# Patient Record
Sex: FEMALE | URBAN - METROPOLITAN AREA
[De-identification: names, ages, dates, MRNs, and addresses within clinical notes are randomized per-mention and may not be internally consistent; named-entity substitution may affect disease eponyms.]

---

## 2022-07-29 ENCOUNTER — APPOINTMENT (RX ONLY)
Dept: URBAN - METROPOLITAN AREA CLINIC 154 | Facility: CLINIC | Age: 27
Setting detail: DERMATOLOGY
End: 2022-07-29

## 2022-08-18 ENCOUNTER — APPOINTMENT (RX ONLY)
Dept: URBAN - METROPOLITAN AREA CLINIC 154 | Facility: CLINIC | Age: 27
Setting detail: DERMATOLOGY
End: 2022-08-18

## 2022-08-18 DIAGNOSIS — Z41.9 ENCOUNTER FOR PROCEDURE FOR PURPOSES OTHER THAN REMEDYING HEALTH STATE, UNSPECIFIED: ICD-10-CM

## 2022-08-18 PROCEDURE — ? LASER TATTOO REMOVAL

## 2022-08-18 ASSESSMENT — LOCATION ZONE DERM
LOCATION ZONE: FEET
LOCATION ZONE: LEG

## 2022-08-18 ASSESSMENT — LOCATION DETAILED DESCRIPTION DERM
LOCATION DETAILED: RIGHT LATERAL MALLEOLUS
LOCATION DETAILED: RIGHT LATERAL ANKLE
LOCATION DETAILED: RIGHT LATERAL ACHILLES SKIN

## 2022-08-18 ASSESSMENT — LOCATION SIMPLE DESCRIPTION DERM
LOCATION SIMPLE: RIGHT FOOT
LOCATION SIMPLE: RIGHT LOWER LEG

## 2022-08-18 NOTE — PROCEDURE: LASER TATTOO REMOVAL
Treatment Number: 1
Laser Type: Q-switched Alexandrite 755nm
Detail Level: Detailed
Fluence: 6
Spot Size In Mm: 2
Fluence: 5
Post-Care Instructions: I reviewed with the patient in detail post-care instructions. Patient should apply vaseline twice daily to tattoo and keep it covered with a non-stick adhesive dressing.
Pre-Procedure Text: The treatment areas were cleaned and treated with the laser parameters noted above.
Tattoo Color Treated: Black
Spot Size In Mm: 3
Anesthesia Type: 1% lidocaine with epinephrine
Wavelength Used (Optional - Include Units): 1064nm
Post-Procedure Text: Vaseline and ice applied. Post care reviewed with patient.
External Cooling Fan Speed: 0
Endpoint Text: Immediate endpoint: skin whitening and pinpoint bleeding.
Fluence: 5.5
Laser Type: Q-switched Nd:Yag 1064nm
Consent: Written consent obtained, risks reviewed including but not limited to crusting, scabbing, blistering, scarring, darker or lighter pigmentary change, systemic reactions, ulceration, incidental hair removal, bruising, and/or incomplete removal.

## 2022-09-09 ENCOUNTER — APPOINTMENT (RX ONLY)
Dept: URBAN - METROPOLITAN AREA CLINIC 154 | Facility: CLINIC | Age: 27
Setting detail: DERMATOLOGY
End: 2022-09-09

## 2022-11-30 ENCOUNTER — RX ONLY (OUTPATIENT)
Age: 27
Setting detail: RX ONLY
End: 2022-11-30

## 2022-11-30 RX ORDER — ONDANSETRON 8 MG/1
TABLET, ORALLY DISINTEGRATING ORAL
Qty: 15 | Refills: 1 | Status: ERX | COMMUNITY
Start: 2022-11-30

## 2023-05-13 ENCOUNTER — APPOINTMENT (RX ONLY)
Dept: URBAN - METROPOLITAN AREA CLINIC 154 | Facility: CLINIC | Age: 28
Setting detail: DERMATOLOGY
End: 2023-05-13

## 2023-05-13 DIAGNOSIS — Z41.9 ENCOUNTER FOR PROCEDURE FOR PURPOSES OTHER THAN REMEDYING HEALTH STATE, UNSPECIFIED: ICD-10-CM

## 2023-05-13 PROCEDURE — ? LASER HAIR REMOVAL

## 2023-05-13 PROCEDURE — ? HYDRAFACIAL

## 2023-05-13 ASSESSMENT — LOCATION SIMPLE DESCRIPTION DERM: LOCATION SIMPLE: LEFT FOREHEAD

## 2023-05-13 ASSESSMENT — LOCATION ZONE DERM: LOCATION ZONE: FACE

## 2023-05-13 ASSESSMENT — LOCATION DETAILED DESCRIPTION DERM: LOCATION DETAILED: LEFT MEDIAL FOREHEAD

## 2023-05-13 NOTE — PROCEDURE: HYDRAFACIAL
Vacuum Pressure High Setting (Will Not Render If Set To 0): 0
Solution: Beta-HD
Consent: Written consent obtained, risks reviewed including but not limited to crusting, scabbing, blistering, scarring, darker or lighter pigmentary change, bruising, and/or incomplete response.
Solution Override
Tip Override
Solution: Antiox-6
Tip: Hydropeel Tip, Clear
Post-Care Instructions: I reviewed with the patient in detail post-care instructions. Patient should stay away from the sun and wear sun protection until treated areas are fully healed.
Treatment Number: 1
Tip: Hydropeel Tip, Blue
Indication: skin texture
Tip: Hydropeel Tip, Teal
Location: face
Procedure: Peel
Procedure: Exfoliation
Procedure: Extraction
Solution: Activ-4
Solution: GlySal 15%
Procedure: Boost

## 2023-05-13 NOTE — PROCEDURE: LASER HAIR REMOVAL
Laser Type: Desire Yag 1060nm
Render Post-Care In The Note: No
Were Eye Shields Employed?: Yes
Number Of Prepaid Treatments (Will Not Render If 0): 0
Post-Procedure Care: Immediate endpoint: perifollicular erythema and edema. Vaseline and ice applied. Post care reviewed with patient.
Total Pulses: 124
Spot Size: 18 mm
Tolerated Procedure (Optional): 9 out of 10
Fluence (Will Not Render If 0): 20
Fluence (Will Not Render If 0): 20
Detail Level: Detailed
Fluence (Will Not Render If 0): 14
Shaving (Optional): The patient shaved at home
Eye Shield Text: Given the treatment area eye shields were inserted prior to treatment.
Consent: Written consent obtained, risks reviewed including but not limited to crusting, scabbing, blistering, scarring, darker or lighter pigmentary change, paradoxical hair regrowth, incomplete removal of hair and infection.
Pre-Procedure: Prior to proceeding the treatment areas were cleaned and all present put on their eye protection.
Fluence (Will Not Render If 0): 10
Post-Care Instructions: I reviewed with the patient in detail post-care instructions. Patient should avoid sun for a minimum of 4 weeks before and after treatment.
Total Pulses: 63

## 2023-06-17 ENCOUNTER — APPOINTMENT (RX ONLY)
Dept: URBAN - METROPOLITAN AREA CLINIC 154 | Facility: CLINIC | Age: 28
Setting detail: DERMATOLOGY
End: 2023-06-17

## 2023-06-17 DIAGNOSIS — Z41.9 ENCOUNTER FOR PROCEDURE FOR PURPOSES OTHER THAN REMEDYING HEALTH STATE, UNSPECIFIED: ICD-10-CM

## 2023-06-17 PROCEDURE — ? LASER HAIR REMOVAL

## 2023-06-17 NOTE — PROCEDURE: LASER HAIR REMOVAL
Post-Procedure Care: Immediate endpoint: perifollicular erythema and edema. Vaseline and ice applied. Post care reviewed with patient.
Number Of Prepaid Treatments (Will Not Render If 0): 0
Spot Size: 18 mm
Shaving (Optional): The patient shaved at home
Render Post-Care In The Note: No
Total Pulses: 124
Eye Shield Text: Given the treatment area eye shields were inserted prior to treatment.
Fluence (Will Not Render If 0): 20
Fluence (Will Not Render If 0): 18
Fluence (Will Not Render If 0): 20
Laser Type: Desire Yag 1060nm
Fluence (Will Not Render If 0): 10
Tolerated Procedure (Optional): 9 out of 10
Consent: Written consent obtained, risks reviewed including but not limited to crusting, scabbing, blistering, scarring, darker or lighter pigmentary change, paradoxical hair regrowth, incomplete removal of hair and infection.
Were Eye Shields Employed?: Yes
Pre-Procedure: Prior to proceeding the treatment areas were cleaned and all present put on their eye protection.
Detail Level: Detailed
Total Pulses: 32
Post-Care Instructions: I reviewed with the patient in detail post-care instructions. Patient should avoid sun for a minimum of 4 weeks before and after treatment.

## 2023-07-28 ENCOUNTER — APPOINTMENT (RX ONLY)
Dept: URBAN - METROPOLITAN AREA CLINIC 154 | Facility: CLINIC | Age: 28
Setting detail: DERMATOLOGY
End: 2023-07-28

## 2023-07-28 DIAGNOSIS — Z41.9 ENCOUNTER FOR PROCEDURE FOR PURPOSES OTHER THAN REMEDYING HEALTH STATE, UNSPECIFIED: ICD-10-CM

## 2023-07-28 PROCEDURE — ? LASER HAIR REMOVAL

## 2023-07-28 PROCEDURE — ? HYDRAFACIAL

## 2023-07-28 ASSESSMENT — LOCATION ZONE DERM: LOCATION ZONE: FACE

## 2023-07-28 ASSESSMENT — LOCATION SIMPLE DESCRIPTION DERM: LOCATION SIMPLE: LEFT FOREHEAD

## 2023-07-28 ASSESSMENT — LOCATION DETAILED DESCRIPTION DERM: LOCATION DETAILED: LEFT INFERIOR MEDIAL FOREHEAD

## 2023-07-28 NOTE — PROCEDURE: HYDRAFACIAL
Tip: Hydropeel Tip, Teal
Solution: Antiox-6
Tip Override
Vacuum Pressure High Setting (Will Not Render If Set To 0): 0
Location: face
Procedure: Peel
Solution: Activ-4
Solution Override
Tip: Hydropeel Tip, Clear
Procedure: Extraction
Solution: GlySal 15%
Consent: Written consent obtained, risks reviewed including but not limited to crusting, scabbing, blistering, scarring, darker or lighter pigmentary change, bruising, and/or incomplete response.
Treatment Number: 1
Procedure: Boost
Solution: Beta-HD
Tip: Hydropeel Tip, Blue
Indication: skin texture
Post-Care Instructions: I reviewed with the patient in detail post-care instructions. Patient should stay away from the sun and wear sun protection until treated areas are fully healed.
Procedure: Exfoliation

## 2023-07-28 NOTE — PROCEDURE: LASER HAIR REMOVAL
Pre-Procedure: Prior to proceeding the treatment areas were cleaned and all present put on their eye protection.
Treatment Number: 0
Total Pulses: 22
Tolerated Procedure (Optional): 9 out of 10
Consent: Written consent obtained, risks reviewed including but not limited to crusting, scabbing, blistering, scarring, darker or lighter pigmentary change, paradoxical hair regrowth, incomplete removal of hair and infection.
Post-Procedure Care: Immediate endpoint: perifollicular erythema and edema. Vaseline and ice applied. Post care reviewed with patient.
Spot Size: Large Sapphire Optics
Spot Size: 18 mm
Detail Level: Detailed
Post-Care Instructions: I reviewed with the patient in detail post-care instructions. Patient should avoid sun for a minimum of 4 weeks before and after treatment.
Were Eye Shields Employed?: Yes
Total Pulses: 124
Laser Type: 650nm
Fluence (Will Not Render If 0): 20
Fluence (Will Not Render If 0): 18
Shaving (Optional): The patient shaved at home
Fluence (Will Not Render If 0): 20
Render Post-Care In The Note: No
Eye Shield Text: Given the treatment area eye shields were inserted prior to treatment.
Fluence (Will Not Render If 0): 10

## 2023-09-09 ENCOUNTER — APPOINTMENT (RX ONLY)
Dept: URBAN - METROPOLITAN AREA CLINIC 154 | Facility: CLINIC | Age: 28
Setting detail: DERMATOLOGY
End: 2023-09-09

## 2023-09-09 DIAGNOSIS — Z41.9 ENCOUNTER FOR PROCEDURE FOR PURPOSES OTHER THAN REMEDYING HEALTH STATE, UNSPECIFIED: ICD-10-CM

## 2023-09-09 PROCEDURE — ? LASER HAIR REMOVAL

## 2023-09-09 NOTE — PROCEDURE: LASER HAIR REMOVAL
Render Post-Care In The Note: No
Number Of Prepaid Treatments (Will Not Render If 0): 0
Fluence (Will Not Render If 0): 19
Pre-Procedure: Prior to proceeding the treatment areas were cleaned and all present put on their eye protection.
Tolerated Procedure (Optional): 9 out of 10
Post-Procedure Care: Immediate endpoint: perifollicular erythema and edema. Vaseline and ice applied. Post care reviewed with patient.
Fluence (Will Not Render If 0): 20
Were Eye Shields Employed?: Yes
Fluence (Will Not Render If 0): 20
Detail Level: Detailed
Shaving (Optional): The patient shaved at home
Total Pulses: 269
Spot Size: Large Sapphire Optics
Consent: Written consent obtained, risks reviewed including but not limited to crusting, scabbing, blistering, scarring, darker or lighter pigmentary change, paradoxical hair regrowth, incomplete removal of hair and infection.
Eye Shield Text: Given the treatment area eye shields were inserted prior to treatment.
Spot Size: Large Sapphire Optics
Laser Type: Desire Yag 1060nm
Post-Care Instructions: I reviewed with the patient in detail post-care instructions. Patient should avoid sun for a minimum of 4 weeks before and after treatment.
Total Pulses: 18

## 2023-10-25 ENCOUNTER — APPOINTMENT (RX ONLY)
Dept: URBAN - METROPOLITAN AREA CLINIC 154 | Facility: CLINIC | Age: 28
Setting detail: DERMATOLOGY
End: 2023-10-25

## 2023-10-25 DIAGNOSIS — Z41.9 ENCOUNTER FOR PROCEDURE FOR PURPOSES OTHER THAN REMEDYING HEALTH STATE, UNSPECIFIED: ICD-10-CM

## 2023-10-25 PROCEDURE — ? HYDRAFACIAL

## 2023-10-25 PROCEDURE — ? LASER HAIR REMOVAL

## 2023-10-25 NOTE — PROCEDURE: HYDRAFACIAL
Treatment Number: 1
Vacuum Pressure Low Setting (Will Not Render If Set To 0): 0
Indication: skin texture
Consent: Written consent obtained, risks reviewed including but not limited to crusting, scabbing, blistering, scarring, darker or lighter pigmentary change, bruising, and/or incomplete response.
Tip: Hydropeel Tip, Blue
Solution: Beta-HD
Tip Override
Tip: Hydropeel Tip, Teal
Procedure: Exfoliation
Post-Care Instructions: I reviewed with the patient in detail post-care instructions. Patient should stay away from the sun and wear sun protection until treated areas are fully healed.
Solution Override
Tip: Hydropeel Tip, Clear
Location: face
Solution: Activ-4
Procedure: Peel
Procedure: Boost
Procedure: Extraction
Solution: Antiox-6
Solution: GlySal 7.5%

## 2023-10-25 NOTE — PROCEDURE: LASER HAIR REMOVAL
Spot Size: Large Sapphire Optics
Consent: Written consent obtained, risks reviewed including but not limited to crusting, scabbing, blistering, scarring, darker or lighter pigmentary change, paradoxical hair regrowth, incomplete removal of hair and infection.
Fluence (Will Not Render If 0): 18
Treatment Number: 0
Pre-Procedure: Prior to proceeding the treatment areas were cleaned and all present put on their eye protection.
Detail Level: Detailed
Post-Procedure Care: Immediate endpoint: perifollicular erythema and edema. Vaseline and ice applied. Post care reviewed with patient.
Total Pulses: 34
Tolerated Procedure (Optional): 9 out of 10
Were Eye Shields Employed?: Yes
Total Pulses: 18
Spot Size: Large Sapphire Optics
Shaving (Optional): The patient shaved at home
Total Pulses: 41
Post-Care Instructions: I reviewed with the patient in detail post-care instructions. Patient should avoid sun for a minimum of 4 weeks before and after treatment.
Laser Type: Desire Yag 1060nm
Fluence (Will Not Render If 0): 20
Eye Shield Text: Given the treatment area eye shields were inserted prior to treatment.
Fluence (Will Not Render If 0): 20
Render Post-Care In The Note: No

## 2023-12-16 ENCOUNTER — APPOINTMENT (RX ONLY)
Dept: URBAN - METROPOLITAN AREA CLINIC 154 | Facility: CLINIC | Age: 28
Setting detail: DERMATOLOGY
End: 2023-12-16

## 2023-12-16 DIAGNOSIS — Z41.9 ENCOUNTER FOR PROCEDURE FOR PURPOSES OTHER THAN REMEDYING HEALTH STATE, UNSPECIFIED: ICD-10-CM

## 2023-12-16 PROCEDURE — ? LASER HAIR REMOVAL

## 2023-12-16 NOTE — PROCEDURE: LASER HAIR REMOVAL
Pre-Procedure: Prior to proceeding the treatment areas were cleaned and all present put on their eye protection.
Tolerated Procedure (Optional): 9 out of 10
Treatment Number: 0
Detail Level: Detailed
Fluence (Will Not Render If 0): 16
Fluence (Will Not Render If 0): 20
Fluence (Will Not Render If 0): 18
Were Eye Shields Employed?: Yes
Render Post-Care In The Note: No
Spot Size: Large Sapphire Optics
Post-Procedure Care: Immediate endpoint: perifollicular erythema and edema. Vaseline and ice applied. Post care reviewed with patient.
Laser Type: Desire Yag 1060nm
Total Pulses: 226
Shaving (Optional): The patient shaved at home
Spot Size: Large Sapphire Optics
Consent: Written consent obtained, risks reviewed including but not limited to crusting, scabbing, blistering, scarring, darker or lighter pigmentary change, paradoxical hair regrowth, incomplete removal of hair and infection.
Eye Shield Text: Given the treatment area eye shields were inserted prior to treatment.
Total Pulses: 17
Fluence (Will Not Render If 0): 19
Total Pulses: 41
Post-Care Instructions: I reviewed with the patient in detail post-care instructions. Patient should avoid sun for a minimum of 4 weeks before and after treatment.

## 2024-01-27 ENCOUNTER — APPOINTMENT (RX ONLY)
Dept: URBAN - METROPOLITAN AREA CLINIC 154 | Facility: CLINIC | Age: 29
Setting detail: DERMATOLOGY
End: 2024-01-27

## 2024-01-27 DIAGNOSIS — Z41.9 ENCOUNTER FOR PROCEDURE FOR PURPOSES OTHER THAN REMEDYING HEALTH STATE, UNSPECIFIED: ICD-10-CM

## 2024-01-27 PROCEDURE — ? HYDRAFACIAL

## 2024-01-27 NOTE — PROCEDURE: HYDRAFACIAL
Number Of Passes Step 4: 0
Procedure: Boost
Tip: Hydropeel Tip, Clear
Treatment Number: 1
Solution: Beta-HD
Tip: Hydropeel Tip, Blue
Consent: Written consent obtained, risks reviewed including but not limited to crusting, scabbing, blistering, scarring, darker or lighter pigmentary change, bruising, and/or incomplete response.
Indication: skin texture
Post-Care Instructions: I reviewed with the patient in detail post-care instructions. Patient should stay away from the sun and wear sun protection until treated areas are fully healed.
Solution: Antiox-6
Tip: Hydropeel Tip, Teal
Tip Override
Procedure: Exfoliation
Solution Override
Location: face
Procedure: Peel
Solution: Activ-4
Procedure: Extraction
Solution: GlySal 15%

## 2024-02-16 ENCOUNTER — APPOINTMENT (RX ONLY)
Dept: URBAN - METROPOLITAN AREA CLINIC 154 | Facility: CLINIC | Age: 29
Setting detail: DERMATOLOGY
End: 2024-02-16

## 2024-02-16 DIAGNOSIS — Z41.9 ENCOUNTER FOR PROCEDURE FOR PURPOSES OTHER THAN REMEDYING HEALTH STATE, UNSPECIFIED: ICD-10-CM

## 2024-02-16 PROCEDURE — ? FILLERS

## 2024-02-16 PROCEDURE — ? DYSPORT

## 2024-02-16 NOTE — PROCEDURE: FILLERS
Additional Area 5 Volume In Cc: 0
Additional Area 1 Location: perioral lines
Lot #: [id number]
Additional Area 1 Location: PERIORAL/Smile lines
Use Map Statement For Sites (Optional): No
Filler: Restylane Kysse
Expiration Date (Month Year): 06/23
Map Statment: See Attach Map for Complete Details
Additional Area 1 Location: perioral
Consent: Written consent obtained. Risks include but not limited to bruising, beading, irregular texture, ulceration, infection, allergic reaction, scar formation, incomplete augmentation, temporary nature, procedural pain.
Post-Care Instructions: Patient instructed to apply ice to reduce swelling.
Anesthesia Type: 1% lidocaine with epinephrine
Additional Area 1 Location: Chin
Expiration Date (Month Year): 08/24
Aspiration Statement: Aspiration was performed prior to injecting site with filler.
Lot #: [id number]
Lot #: [id number]
Additional Anesthesia Volume In Cc: 6
Detail Level: Detailed
Vermilion Lips Filler Volume In Cc: 1
Expiration Date (Month Year): 08/24/23

## 2024-02-16 NOTE — PROCEDURE: DYSPORT
Masseter Units: 0
Show Depressor Anguli Units: Yes
Detail Level: Detailed
Show Ucl Units: No
Lot #: [id number]
Consent: Written consent obtained. Risks include but not limited to lid/brow ptosis, bruising, swelling, diplopia, temporary effect, incomplete chemical denervation.
Glabellar Complex Units: 35
Additional Area 1 Location: [facility]
Dilution (U/0.1 Cc): 4
Post-Care Instructions: Patient instructed to not lie down for 4 hours and limit physical activity for 24 hours.
Expiration Date (Month Year): 12/22
Forehead Units: 15

## 2024-09-07 ENCOUNTER — APPOINTMENT (RX ONLY)
Dept: URBAN - METROPOLITAN AREA CLINIC 154 | Facility: CLINIC | Age: 29
Setting detail: DERMATOLOGY
End: 2024-09-07

## 2024-09-07 DIAGNOSIS — Z41.9 ENCOUNTER FOR PROCEDURE FOR PURPOSES OTHER THAN REMEDYING HEALTH STATE, UNSPECIFIED: ICD-10-CM

## 2024-09-07 PROCEDURE — ? HYDRAFACIAL

## 2024-09-07 PROCEDURE — ? DERMAPLANE

## 2024-09-07 ASSESSMENT — LOCATION DETAILED DESCRIPTION DERM: LOCATION DETAILED: RIGHT INFERIOR MEDIAL FOREHEAD

## 2024-09-07 ASSESSMENT — LOCATION ZONE DERM: LOCATION ZONE: FACE

## 2024-09-07 ASSESSMENT — LOCATION SIMPLE DESCRIPTION DERM: LOCATION SIMPLE: RIGHT FOREHEAD

## 2024-09-07 NOTE — PROCEDURE: HYDRAFACIAL
Treatment Number: 1
Vacuum Pressure High Setting (Will Not Render If Set To 0): 0
Tip Override
Procedure: Fusion
Solution: Antiox-6
Indication: skin texture
Tip: Hydropeel Tip, Teal
Procedure: Exfoliation
Tip: Hydropeel Tip, Blue
Procedure: Extraction
Solution Override
Procedure: Peel
Solution: Beta-HD
Tip: Hydropeel Tip, Clear
Consent: Written consent obtained, risks reviewed including but not limited to crusting, scabbing, blistering, scarring, darker or lighter pigmentary change, bruising, and/or incomplete response.
Location: face
Post-Care Instructions: I reviewed with the patient in detail post-care instructions. Patient should stay away from the sun and wear sun protection until treated areas are fully healed.
Solution: GlySal 15%
Solution: Activ-4

## 2024-09-07 NOTE — PROCEDURE: DERMAPLANE
Pre-Procedure Text: The patient was placed in a recumbant position on the procedure table.
Treatment Area Prep: alcohol
Blade: [place] scalpel
Detail Level: Zone
Post-Procedure Instructions: Following the dermaplane procedure, Oxymist treatment was applied to the treatment areas. Moisturizer and SPF was applied.
Post-Care Instructions: I reviewed with the patient in detail post-care instructions.
Treatment Areas: face

## 2025-01-23 ENCOUNTER — APPOINTMENT (OUTPATIENT)
Dept: URBAN - METROPOLITAN AREA CLINIC 154 | Facility: CLINIC | Age: 30
Setting detail: DERMATOLOGY
End: 2025-01-23

## 2025-01-23 DIAGNOSIS — Z41.9 ENCOUNTER FOR PROCEDURE FOR PURPOSES OTHER THAN REMEDYING HEALTH STATE, UNSPECIFIED: ICD-10-CM

## 2025-01-23 PROCEDURE — ? JEUVEAU

## 2025-01-23 PROCEDURE — ? COSMETIC CONSULTATION: PRP

## 2025-01-23 NOTE — PROCEDURE: JEUVEAU
Expiration Date (Month Year): 4/23
Additional Area 1 Location: jelly roll
Show Topical Anesthesia: Yes
Right Pupillary Line Units: 0
Show Ucl Units: No
Lot #: [id number]
Additional Area 1 Units: 2
Masseter Units: 30
Dilution (U/0.1 Cc): 4
Consent: Written consent obtained. Risks include but not limited to lid/brow ptosis, bruising, swelling, diplopia, temporary effect, incomplete chemical denervation.
Post-Care Instructions: Patient instructed to not lie down for 4 hours and limit physical activity for 24 hours.
Detail Level: Detailed

## 2025-02-21 ENCOUNTER — APPOINTMENT (OUTPATIENT)
Dept: URBAN - METROPOLITAN AREA CLINIC 154 | Facility: CLINIC | Age: 30
Setting detail: DERMATOLOGY
End: 2025-02-21

## 2025-02-21 DIAGNOSIS — Z41.9 ENCOUNTER FOR PROCEDURE FOR PURPOSES OTHER THAN REMEDYING HEALTH STATE, UNSPECIFIED: ICD-10-CM

## 2025-02-21 PROCEDURE — ? DYSPORT

## 2025-02-21 PROCEDURE — ? FILLERS

## 2025-02-21 NOTE — PROCEDURE: FILLERS
Anesthesia Volume In Cc: 0
Include Cannula Information In Note?: No
Additional Area 1 Location: perioral lines
Expiration Date (Month Year): 08/24
Additional Anesthesia Volume In Cc: 6
Lot #: [id number]
Lot #: [id number]
Expiration Date (Month Year): 08/24/23
Expiration Date (Month Year): 06/23
Filler: Restylane Kysse
Detail Level: Detailed
Map Statment: See Attach Map for Complete Details
Consent: Written consent obtained. Risks include but not limited to bruising, beading, irregular texture, ulceration, infection, allergic reaction, scar formation, incomplete augmentation, temporary nature, procedural pain.
Additional Area 2 Location: lateral smile leia
Vermilion Lips Filler Volume In Cc: 1
Anesthesia Type: 1% lidocaine with epinephrine
Post-Care Instructions: Patient instructed to apply ice to reduce swelling.
Lot #: [id number]
Aspiration Statement: Aspiration was performed prior to injecting site with filler.

## 2025-02-21 NOTE — PROCEDURE: DYSPORT
Detail Level: Detailed
Inferior Lateral Orbicularis Oculi Units: 0
Show Glabellar Units: Yes
Show Right And Left Brow Units: No
Consent: Written consent obtained. Risks include but not limited to lid/brow ptosis, bruising, swelling, diplopia, temporary effect, incomplete chemical denervation.
Lot #: [id number]
Dilution (U/0.1 Cc): 4
Post-Care Instructions: Patient instructed to not lie down for 4 hours and limit physical activity for 24 hours.
Expiration Date (Month Year): 12/22
Additional Area 1 Location: jawline
Glabellar Complex Units: 50

## 2025-05-31 ENCOUNTER — APPOINTMENT (OUTPATIENT)
Dept: URBAN - METROPOLITAN AREA CLINIC 154 | Facility: CLINIC | Age: 30
Setting detail: DERMATOLOGY
End: 2025-05-31

## 2025-05-31 DIAGNOSIS — Z41.9 ENCOUNTER FOR PROCEDURE FOR PURPOSES OTHER THAN REMEDYING HEALTH STATE, UNSPECIFIED: ICD-10-CM

## 2025-05-31 PROCEDURE — ? DERMAPLANE

## 2025-05-31 PROCEDURE — ? HYDRAFACIAL

## 2025-05-31 ASSESSMENT — LOCATION DETAILED DESCRIPTION DERM: LOCATION DETAILED: INFERIOR MID FOREHEAD

## 2025-05-31 ASSESSMENT — LOCATION SIMPLE DESCRIPTION DERM: LOCATION SIMPLE: INFERIOR FOREHEAD

## 2025-05-31 ASSESSMENT — LOCATION ZONE DERM: LOCATION ZONE: FACE

## 2025-05-31 NOTE — PROCEDURE: DERMAPLANE
Pre-Procedure Text: The patient was placed in a recumbant position on the procedure table.
Treatment Areas: face
Post-Procedure Instructions: Following the dermaplane procedure, Oxymist treatment was applied to the treatment areas. Moisturizer and SPF was applied.
Treatment Area Prep: alcohol
Detail Level: Zone
Blade: [place] scalpel
Post-Care Instructions: I reviewed with the patient in detail post-care instructions.

## 2025-05-31 NOTE — PROCEDURE: HYDRAFACIAL
Vacuum Pressure Low Setting (Will Not Render If Set To 0): 0
Tip: Hydropeel Tip, Clear
Solution: Antiox-6
Location Override: neck and chest
Solution: GlySal 7.5%
Tip: Hydropeel Tip, Teal
Procedure: Extraction
Consent: Written consent obtained, risks reviewed including but not limited to crusting, scabbing, blistering, scarring, darker or lighter pigmentary change, bruising, and/or incomplete response.
Treatment Number: 1
Tip Override
Tip: Hydropeel Tip, Blue
Indication: skin texture
Procedure: Exfoliation
Post-Care Instructions: I reviewed with the patient in detail post-care instructions. Patient should stay away from the sun and wear sun protection until treated areas are fully healed.
Solution: Beta-HD
Location: face
Procedure: Fusion
Solution Override
Solution: Activ-4
Procedure: Peel